# Patient Record
Sex: FEMALE | Race: WHITE | NOT HISPANIC OR LATINO | URBAN - METROPOLITAN AREA
[De-identification: names, ages, dates, MRNs, and addresses within clinical notes are randomized per-mention and may not be internally consistent; named-entity substitution may affect disease eponyms.]

---

## 2017-09-04 ENCOUNTER — EMERGENCY (EMERGENCY)
Facility: HOSPITAL | Age: 27
LOS: 0 days | Discharge: HOME | End: 2017-09-05
Attending: EMERGENCY MEDICINE | Admitting: EMERGENCY MEDICINE
Payer: COMMERCIAL

## 2017-09-04 VITALS
TEMPERATURE: 98 F | WEIGHT: 229.94 LBS | SYSTOLIC BLOOD PRESSURE: 132 MMHG | DIASTOLIC BLOOD PRESSURE: 96 MMHG | HEART RATE: 80 BPM | OXYGEN SATURATION: 100 %

## 2017-09-04 DIAGNOSIS — R10.2 PELVIC AND PERINEAL PAIN: ICD-10-CM

## 2017-09-04 PROCEDURE — 99285 EMERGENCY DEPT VISIT HI MDM: CPT

## 2017-09-05 DIAGNOSIS — Z90.49 ACQUIRED ABSENCE OF OTHER SPECIFIED PARTS OF DIGESTIVE TRACT: Chronic | ICD-10-CM

## 2017-09-05 RX ORDER — OXYCODONE HYDROCHLORIDE 5 MG/1
5 TABLET ORAL ONCE
Qty: 0 | Refills: 0 | Status: DISCONTINUED | OUTPATIENT
Start: 2017-09-05 | End: 2017-09-05

## 2017-09-05 RX ORDER — HYDROCORTISONE 1 %
1 OINTMENT (GRAM) TOPICAL ONCE
Qty: 0 | Refills: 0 | Status: COMPLETED | OUTPATIENT
Start: 2017-09-05 | End: 2017-09-05

## 2017-09-05 RX ORDER — OXYCODONE HYDROCHLORIDE 5 MG/1
1 TABLET ORAL
Qty: 10 | Refills: 0 | OUTPATIENT
Start: 2017-09-05

## 2017-09-05 RX ADMIN — OXYCODONE HYDROCHLORIDE 5 MILLIGRAM(S): 5 TABLET ORAL at 00:59

## 2017-09-05 RX ADMIN — Medication 1 APPLICATION(S): at 00:59

## 2017-09-05 NOTE — ED PROVIDER NOTE - OBJECTIVE STATEMENT
27 F presents with co vaginal pain for the last 2 days that has been getting worse.  Pt states that she was on Augmentin for strep-throat about 2 weeks ago, had an IUD placed last week and started to develop a yeast infection.  She used Monistat and her vaginal pain started after that treatment.  No abd pain, no co HA, dizziness, cp, sob, abd pain, fever, nvd.  No dysuria but urinating exacerbates the vaginal burning.

## 2017-09-05 NOTE — ED PROVIDER NOTE - MEDICAL DECISION MAKING DETAILS
Pt with vaginal pain. will rx with topical steroids to area and pain relief.  Pt advised to follow up with PMD / GYN tomorrow.

## 2017-09-05 NOTE — ED PROVIDER NOTE - GENITOURINARY, MLM
(+) tender erythema to labia majora with few scant small clear vesicles.  No vaginal DC.  tender exam.

## 2020-06-28 ENCOUNTER — EMERGENCY (EMERGENCY)
Facility: HOSPITAL | Age: 30
LOS: 1 days | Discharge: HOME | End: 2020-06-28
Attending: EMERGENCY MEDICINE | Admitting: EMERGENCY MEDICINE
Payer: SELF-PAY

## 2020-06-28 VITALS
TEMPERATURE: 98 F | DIASTOLIC BLOOD PRESSURE: 94 MMHG | HEART RATE: 94 BPM | SYSTOLIC BLOOD PRESSURE: 131 MMHG | RESPIRATION RATE: 16 BRPM | WEIGHT: 229.94 LBS | OXYGEN SATURATION: 100 % | HEIGHT: 62 IN

## 2020-06-28 VITALS
HEART RATE: 78 BPM | OXYGEN SATURATION: 97 % | RESPIRATION RATE: 15 BRPM | TEMPERATURE: 99 F | SYSTOLIC BLOOD PRESSURE: 124 MMHG | DIASTOLIC BLOOD PRESSURE: 90 MMHG

## 2020-06-28 DIAGNOSIS — Z90.49 ACQUIRED ABSENCE OF OTHER SPECIFIED PARTS OF DIGESTIVE TRACT: Chronic | ICD-10-CM

## 2020-06-28 PROCEDURE — 73610 X-RAY EXAM OF ANKLE: CPT | Mod: 26,LT

## 2020-06-28 PROCEDURE — 73630 X-RAY EXAM OF FOOT: CPT | Mod: 26,LT

## 2020-06-28 PROCEDURE — 99053 MED SERV 10PM-8AM 24 HR FAC: CPT

## 2020-06-28 PROCEDURE — 99284 EMERGENCY DEPT VISIT MOD MDM: CPT | Mod: 25

## 2020-06-28 PROCEDURE — 73610 X-RAY EXAM OF ANKLE: CPT

## 2020-06-28 PROCEDURE — 99283 EMERGENCY DEPT VISIT LOW MDM: CPT

## 2020-06-28 PROCEDURE — 73630 X-RAY EXAM OF FOOT: CPT

## 2020-06-28 RX ORDER — OXYCODONE AND ACETAMINOPHEN 5; 325 MG/1; MG/1
1 TABLET ORAL ONCE
Refills: 0 | Status: DISCONTINUED | OUTPATIENT
Start: 2020-06-28 | End: 2020-06-28

## 2020-06-28 RX ADMIN — OXYCODONE AND ACETAMINOPHEN 1 TABLET(S): 5; 325 TABLET ORAL at 01:52

## 2020-06-28 NOTE — ED PROVIDER NOTE - NSFOLLOWUPINSTRUCTIONS_ED_ALL_ED_FT
Return to the ED for any new or worsening symptoms  Take your medication as prescribed  Percocet per label instructions as needed for pain   Keep splint in place, remain non weight bearing until cleared by podiatry   Elevate leg when seated to reduce swelling   Advance activity as tolerated

## 2020-06-28 NOTE — ED PROVIDER NOTE - CLINICAL SUMMARY MEDICAL DECISION MAKING FREE TEXT BOX
Pt is a 31 yo female who presents to the ED with a cc of left ankle pain.  Pt reports that she has no significant past medical history.  States that she was at work today and someone accidently ran over her left ankle with a pallet parker.  She reports pain and swelling to left ankle.  Denies numbness or tingling to ext.  She has not been able to bear weight since the injury.  Denies prior injury to left ankle.  Denies additional injury. Pt denies chance of pregnancy and has not taken anything for the pain.  Pt with MSK injury to left ankle s/p work place injury.  NVI.  Will obtain x-ray, medicate for pain and monitor

## 2020-06-28 NOTE — ED PROVIDER NOTE - MUSCULOSKELETAL, MLM
Left ankle: Diffuse TTP to ankle most significant to lateral aspect with small abrasion, and mild ecchymosis sensation diffusely intact to ext, +pedal pulse cap refill less then 2 seconds.  No TTP to remainder to LLE

## 2020-06-28 NOTE — ED ADULT NURSE NOTE - OBJECTIVE STATEMENT
Pt presents to ED with ankle pain. Pt states she was at work and her ankle got ran over by a machine. Pt is unable to bear weight on LL extremity. Swelling is noted, no bruise at this time. Pt denies numbness and tingling.

## 2020-06-28 NOTE — ED PROVIDER NOTE - PROGRESS NOTE DETAILS
x-rays reviewed with night radiology no acute fracture noted.  Pt advised that sometimes initial fractures can be missed on imaging.  Based on mechanism, pain, and swelling will splint.  Advised to follow up with podiatry for further work up.  Remains NVI.  All questions answered

## 2020-06-28 NOTE — ED ADULT TRIAGE NOTE - MODE OF ARRIVAL
August 30, 2018      Kim Cooks  [address] [place] [id number]      Dear Kim Cooks,    We have been unable to contact you via the telephone regarding your recent test(s) performed at the clinic.     At your earliest convenience, please contact the clinic so that this information may be communicated to you.    Thank you,      Office Of Cristi Schmidt,   [address] [facility] [id number] [phone]   [employer] in [employer]

## 2020-06-28 NOTE — ED PROVIDER NOTE - OBJECTIVE STATEMENT
Pt is a 31 yo female who presents to the ED with a cc of left ankle pain.  Pt reports that she has no significant past medical history.  States that she was at work today and someone accidently ran over her left ankle with a pallet parker.  She reports pain and swelling to left ankle.  Denies numbness or tingling to ext.  She has not been able to bear weight since the injury.  Denies prior injury to left ankle.  Denies additional injury. Pt denies chance of pregnancy and has not taken anything for the pain

## 2020-06-28 NOTE — ED ADULT NURSE NOTE - NSIMPLEMENTINTERV_GEN_ALL_ED
Implemented All Fall Risk Interventions:  [place] to call system. Call bell, personal items and telephone within reach. Instruct patient to call for assistance. Room bathroom lighting operational. Non-slip footwear when patient is off stretcher. Physically safe environment: no spills, clutter or unnecessary equipment. Stretcher in lowest position, wheels locked, appropriate side rails in place. Provide visual cue, wrist band, yellow gown, etc. Monitor gait and stability. Monitor for mental status changes and reorient to person, place, and time. Review medications for side effects contributing to fall risk. Reinforce activity limits and safety measures with patient and family.

## 2020-06-28 NOTE — ED PROVIDER NOTE - PATIENT PORTAL LINK FT
You can access the FollowMyHealth Patient Portal offered by [facility] by registering at the following website: [email or web address]. By joining [facility]’s FollowMyHealth portal, you will also be able to view your health information using other applications (apps) compatible with our system.

## 2020-06-28 NOTE — ED PROVIDER NOTE - CARE PROVIDER_API CALL
Marco A Lopez (DPVERONIKA)  [place] [address]  Phone: [phone]  Fax: [phone]  Follow Up Time:

## 2020-07-01 ENCOUNTER — APPOINTMENT (OUTPATIENT)
Dept: WOUND CARE | Facility: HOSPITAL | Age: 30
End: 2020-07-01
Payer: OTHER MISCELLANEOUS

## 2020-07-01 ENCOUNTER — OUTPATIENT (OUTPATIENT)
Dept: OUTPATIENT SERVICES | Facility: HOSPITAL | Age: 30
LOS: 1 days | Discharge: HOME | End: 2020-07-01
Payer: COMMERCIAL

## 2020-07-01 VITALS
TEMPERATURE: 97.7 F | DIASTOLIC BLOOD PRESSURE: 87 MMHG | HEART RATE: 88 BPM | BODY MASS INDEX: 42.33 KG/M2 | OXYGEN SATURATION: 98 % | SYSTOLIC BLOOD PRESSURE: 139 MMHG | WEIGHT: 230 LBS | RESPIRATION RATE: 18 BRPM | HEIGHT: 62 IN

## 2020-07-01 DIAGNOSIS — Z90.49 ACQUIRED ABSENCE OF OTHER SPECIFIED PARTS OF DIGESTIVE TRACT: Chronic | ICD-10-CM

## 2020-07-01 DIAGNOSIS — S93.409A SPRAIN OF UNSPECIFIED LIGAMENT OF UNSPECIFIED ANKLE, INITIAL ENCOUNTER: ICD-10-CM

## 2020-07-01 DIAGNOSIS — Z78.9 OTHER SPECIFIED HEALTH STATUS: ICD-10-CM

## 2020-07-01 PROCEDURE — G0463: CPT

## 2020-07-01 PROCEDURE — 99203 OFFICE O/P NEW LOW 30 MIN: CPT

## 2020-07-01 RX ORDER — LEVONORGESTREL 19.5 MG/1
INTRAUTERINE DEVICE INTRAUTERINE
Refills: 0 | Status: ACTIVE | COMMUNITY

## 2020-07-01 NOTE — REVIEW OF SYSTEMS
[Fever] : no fever [Eye Pain] : no eye pain [Earache] : no earache [Chest Pain] : no chest pain [Shortness Of Breath] : no shortness of breath [Cough] : no cough [Abdominal Pain] : no abdominal pain [Limb Pain] : limb pain [Skin Wound] : no skin wound [Dizziness] : no dizziness

## 2020-07-01 NOTE — PLAN
[FreeTextEntry1] : Patient examined and evaluated at this time.\par Continue offloading with posterior splint and crutches.\par Will obtain an mri of the left midfoot and rearfoot.\par Pt will return in 2 weeks for follow up.\par \par

## 2020-07-01 NOTE — ASSESSMENT
[Verbal] : Verbal [Written] : Written [Patient] : Patient [Demo] : Demo [Good - alert, interested, motivated] : Good - alert, interested, motivated [Verbalizes knowledge/Understanding] : Verbalizes knowledge/understanding [Foot Care] : foot care [How and When to Call] : how and when to call [Signs and symptoms of infection] : sign and symptoms of infection [Labs and Tests] : labs and tests [Off-loading] : off-loading [Compression Therapy] : compression therapy [Patient responsibility to plan of care] : patient responsibility to plan of care [] : Yes [Crutches] : Crutches [Stable] : stable [Home] : Home [Not Applicable - Long Term Care/Home Health Agency] : Long Term Care/Home Health Agency: Not Applicable [FreeTextEntry2] : Infection prevention\par Offloading \par Acceptable pain tolerance levels deemed to be 3/10.\par Goal of remaining pain free regarding wounds.\par  [FreeTextEntry4] : Auth submitted for MRI\par x rays reviewed by DPM with patient \par Follow up in 2 weeks

## 2020-07-01 NOTE — PHYSICAL EXAM
[JVD] : jugular venous distention ~L [2+] : left 2+ [Calm] : calm [Ankle Swelling (On Exam)] : not present [Varicose Veins Of Lower Extremities] : not present [] : not present [de-identified] : calm [de-identified] : tenderness to palpation of the left midfoot and rearfoot, ROM deferred [de-identified] : ecchymosis noted to the lateral midfoot [FreeTextEntry1] : Left foot - Minor swelling - No open wound  [de-identified] : Expressed comfort post ACE application. [de-identified] : No other treatment \par  [de-identified] : Splint  [de-identified] : Dorsalis Pedis:  + 2 Bilateral \par Posterior Tibialis:  +2 Bilateral \par Doppler pulses:  N/A\par Extremity color: Pink \par Extremity temperature: Warm \par Capillary refill: < 3 sec\par \par  [de-identified] : Normal [de-identified] : None [de-identified] : None [de-identified] : No [de-identified] : Ace wraps [de-identified] : Weekly [de-identified] : Compression

## 2020-07-01 NOTE — REASON FOR VISIT
[Consultation] : a consultation visit [FreeTextEntry1] : Patients left foot/ankle was accidently run over by a large pallet at work at [employer]. Patient went to [place] ER that day where x rays where taken and were negative for any fractures. Splint applied, patient using crutches.

## 2020-07-01 NOTE — HISTORY OF PRESENT ILLNESS
[FreeTextEntry1] : Patients left foot/ankle was accidently run over by a large pallet at work at [employer] on 6/27/20. Patient went to [place] ER the same day where x rays where taken and were negative for any fractures. Splint applied, patient using crutches.

## 2020-07-01 NOTE — VITALS
[Pain related to present condition?] : The patient's  pain is related to present condition. [Dull] : dull [] : Yes [Occasional] : occasional [de-identified] : 3/10       Acceptable pain tolerance levels deemed to be 3/10. [FreeTextEntry3] : Left foot  [FreeTextEntry1] : Tylenol  [FreeTextEntry2] : Weight bearing  [FreeTextEntry4] : New splinted dressing

## 2020-07-02 DIAGNOSIS — Z90.49 ACQUIRED ABSENCE OF OTHER SPECIFIED PARTS OF DIGESTIVE TRACT: ICD-10-CM

## 2020-07-02 DIAGNOSIS — M79.672 PAIN IN LEFT FOOT: ICD-10-CM

## 2020-07-02 DIAGNOSIS — Z87.828 PERSONAL HISTORY OF OTHER (HEALED) PHYSICAL INJURY AND TRAUMA: ICD-10-CM

## 2020-07-13 PROBLEM — Z78.9 OTHER SPECIFIED HEALTH STATUS: Chronic | Status: ACTIVE | Noted: 2020-06-28

## 2020-07-14 ENCOUNTER — OUTPATIENT (OUTPATIENT)
Dept: OUTPATIENT SERVICES | Facility: HOSPITAL | Age: 30
LOS: 1 days | Discharge: HOME | End: 2020-07-14
Payer: COMMERCIAL

## 2020-07-14 ENCOUNTER — APPOINTMENT (OUTPATIENT)
Dept: WOUND CARE | Facility: HOSPITAL | Age: 30
End: 2020-07-14
Payer: OTHER MISCELLANEOUS

## 2020-07-14 VITALS
RESPIRATION RATE: 20 BRPM | HEART RATE: 89 BPM | SYSTOLIC BLOOD PRESSURE: 141 MMHG | TEMPERATURE: 97.6 F | HEIGHT: 62 IN | WEIGHT: 230 LBS | DIASTOLIC BLOOD PRESSURE: 93 MMHG | OXYGEN SATURATION: 96 % | BODY MASS INDEX: 42.33 KG/M2

## 2020-07-14 DIAGNOSIS — Z90.49 ACQUIRED ABSENCE OF OTHER SPECIFIED PARTS OF DIGESTIVE TRACT: Chronic | ICD-10-CM

## 2020-07-14 DIAGNOSIS — M79.672 PAIN IN LEFT FOOT: ICD-10-CM

## 2020-07-14 DIAGNOSIS — Z87.828 PERSONAL HISTORY OF OTHER (HEALED) PHYSICAL INJURY AND TRAUMA: ICD-10-CM

## 2020-07-14 DIAGNOSIS — Z90.49 ACQUIRED ABSENCE OF OTHER SPECIFIED PARTS OF DIGESTIVE TRACT: ICD-10-CM

## 2020-07-14 PROCEDURE — G0463: CPT

## 2020-07-14 PROCEDURE — 99213 OFFICE O/P EST LOW 20 MIN: CPT

## 2020-07-15 NOTE — PHYSICAL EXAM
[JVD] : jugular venous distention ~L [2+] : left 2+ [Calm] : calm [] : not present [Ankle Swelling (On Exam)] : not present [Varicose Veins Of Lower Extremities] : not present [de-identified] : ecchymosis noted to the lateral midfoot [de-identified] : calm [de-identified] : tenderness to palpation of the left midfoot and rearfoot, ROM deferred, MRI shows peroneus brevis tear [de-identified] : No neurovascular deficit noted [FreeTextEntry1] : Left Foot - no open wound [de-identified] : Ace wraps [de-identified] : Compression [de-identified] : Weekly

## 2020-07-15 NOTE — PLAN
[FreeTextEntry1] : Patient examined and evaluated at this time.\par Continue offloading crutches.\par MRI reviewed with patient.\par Discussed conservative vs surgical treatment. Will proceed with conservative treatment at this time.\par Pt will return in 4 weeks for follow up.\par \par

## 2020-07-15 NOTE — ASSESSMENT
[Written] : Written [Verbal] : Verbal [Good - alert, interested, motivated] : Good - alert, interested, motivated [Patient] : Patient [Verbalizes knowledge/Understanding] : Verbalizes knowledge/understanding [Dressing changes] : dressing changes [Skin Care] : skin care [Compression Therapy] : compression therapy [Foot Care] : foot care [Pressure relief] : pressure relief [How and When to Call] : how and when to call [Off-loading] : off-loading [Patient responsibility to plan of care] : patient responsibility to plan of care [Stable] : stable [Home] : Home [Crutches] : Crutches [Not Applicable - Long Term Care/Home Health Agency] : Long Term Care/Home Health Agency: Not Applicable [] : No [FreeTextEntry2] : Offloading \par Pain management strategies to maintain acceptable limits of tolerance  [FreeTextEntry4] : F/U in 1 month \par Discussed findings of MRI\par Discussed possible surgical options pending progress \par Continue cool compresses and elevation\par

## 2020-07-15 NOTE — VITALS
[Pain related to present condition?] : The patient's  pain is related to present condition. [Occasional] : occasional [Shooting] : shooting [] : No [de-identified] : Occasional pain - when patient puts pressure foot [FreeTextEntry3] : Left Foot [FreeTextEntry2] : Weight bearing  [FreeTextEntry1] : Rest [FreeTextEntry4] : Rest/elevate foot

## 2020-07-15 NOTE — HISTORY OF PRESENT ILLNESS
[FreeTextEntry1] : Patients left foot/ankle was accidently run over by a large pallet at work at [employer] on 6/27/20. Patient went to [place] ER the same day where x rays where taken and were negative for any fractures. Splint applied, patient using crutches. Pt notes she was able to obtain an MRI of the left foot as advised. Relates her foot feels much better and is only tender when putting pressure on the left foot.

## 2020-07-15 NOTE — REVIEW OF SYSTEMS
[Limb Pain] : limb pain [Earache] : no earache [Fever] : no fever [Eye Pain] : no eye pain [Cough] : no cough [Shortness Of Breath] : no shortness of breath [Chest Pain] : no chest pain [Abdominal Pain] : no abdominal pain [Dizziness] : no dizziness [Skin Wound] : no skin wound [FreeTextEntry9] : MRI shows peroneus brevis tear

## 2020-07-21 ENCOUNTER — TRANSCRIPTION ENCOUNTER (OUTPATIENT)
Age: 30
End: 2020-07-21

## 2020-08-10 ENCOUNTER — APPOINTMENT (OUTPATIENT)
Dept: WOUND CARE | Facility: HOSPITAL | Age: 30
End: 2020-08-10
Payer: OTHER MISCELLANEOUS

## 2020-08-10 ENCOUNTER — OUTPATIENT (OUTPATIENT)
Dept: OUTPATIENT SERVICES | Facility: HOSPITAL | Age: 30
LOS: 1 days | Discharge: HOME | End: 2020-08-10
Payer: COMMERCIAL

## 2020-08-10 VITALS
HEIGHT: 62 IN | RESPIRATION RATE: 20 BRPM | TEMPERATURE: 98.1 F | SYSTOLIC BLOOD PRESSURE: 131 MMHG | OXYGEN SATURATION: 97 % | HEART RATE: 86 BPM | DIASTOLIC BLOOD PRESSURE: 87 MMHG | WEIGHT: 230 LBS | BODY MASS INDEX: 42.33 KG/M2

## 2020-08-10 DIAGNOSIS — M79.672 PAIN IN LEFT FOOT: ICD-10-CM

## 2020-08-10 DIAGNOSIS — Z87.828 PERSONAL HISTORY OF OTHER (HEALED) PHYSICAL INJURY AND TRAUMA: ICD-10-CM

## 2020-08-10 DIAGNOSIS — Z90.49 ACQUIRED ABSENCE OF OTHER SPECIFIED PARTS OF DIGESTIVE TRACT: Chronic | ICD-10-CM

## 2020-08-10 PROCEDURE — 99213 OFFICE O/P EST LOW 20 MIN: CPT

## 2020-08-10 PROCEDURE — G0463: CPT

## 2020-08-10 NOTE — ASSESSMENT
[Written] : Written [Verbal] : Verbal [Patient] : Patient [Good - alert, interested, motivated] : Good - alert, interested, motivated [Dressing changes] : dressing changes [Foot Care] : foot care [Verbalizes knowledge/Understanding] : Verbalizes knowledge/understanding [How and When to Call] : how and when to call [Pressure relief] : pressure relief [Skin Care] : skin care [Compression Therapy] : compression therapy [Off-loading] : off-loading [] : Yes [Patient responsibility to plan of care] : patient responsibility to plan of care [Stable] : stable [Home] : Home [Crutches] : Crutches [Not Applicable - Long Term Care/Home Health Agency] : Long Term Care/Home Health Agency: Not Applicable [FreeTextEntry4] : Discharged/Photos Taken/John\par Note from DPM given to patient to return back to work.\par  [FreeTextEntry2] : Offloading \par Pain management strategies to maintain acceptable limits of tolerance

## 2020-08-10 NOTE — REVIEW OF SYSTEMS
[Fever] : no fever [Eye Pain] : no eye pain [Earache] : no earache [Shortness Of Breath] : no shortness of breath [Chest Pain] : no chest pain [Cough] : no cough [Limb Pain] : limb pain [Abdominal Pain] : no abdominal pain [Skin Wound] : no skin wound [Dizziness] : no dizziness [FreeTextEntry9] : MRI shows peroneus brevis tear

## 2020-08-10 NOTE — HISTORY OF PRESENT ILLNESS
[FreeTextEntry1] : Patients left foot/ankle was accidently run over by a large pallet at work at [employer] on 6/27/20. Patient went to [place] ER the same day where x rays where taken and were negative for any fractures. Splint applied, patient using crutches. Pt relates her foot feels much better and denies any pain at this time. Denies any other complaints.

## 2020-08-10 NOTE — PHYSICAL EXAM
[JVD] : jugular venous distention ~L [2+] : left 2+ [Varicose Veins Of Lower Extremities] : not present [Ankle Swelling (On Exam)] : not present [] : not present [Calm] : calm [de-identified] : no tenderness to palpation of the left midfoot and rearfoot, ROM deferred, MRI shows peroneus brevis tear [de-identified] : calm [de-identified] : No neurovascular deficit noted [de-identified] : ecchymosis to the lateral midfoot resolved [FreeTextEntry1] : Left Foot - no open wound [de-identified] : Weekly [de-identified] : Ace wraps [de-identified] : Compression

## 2020-08-10 NOTE — PLAN
[FreeTextEntry1] : Patient happy with outcome of treatment.\par Patient to be discharged at this time.\par All questions answered to satisfaction and patient verbalized understanding.\par Patient may return to work without any restrictions.

## 2020-08-12 DIAGNOSIS — M79.672 PAIN IN LEFT FOOT: ICD-10-CM

## 2020-08-12 DIAGNOSIS — Z87.828 PERSONAL HISTORY OF OTHER (HEALED) PHYSICAL INJURY AND TRAUMA: ICD-10-CM

## 2020-08-12 DIAGNOSIS — Z90.49 ACQUIRED ABSENCE OF OTHER SPECIFIED PARTS OF DIGESTIVE TRACT: ICD-10-CM

## 2022-02-15 NOTE — ED PROVIDER NOTE - ENMT, MLM
Fall, musculoskeletal chest pain, with deep breath.    Labs and imaging reviewed Airway patent, Nasal mucosa clear. Mouth with normal mucosa. Throat has no vesicles, no oropharyngeal exudates and uvula is midline.

## 2025-02-16 ENCOUNTER — NON-APPOINTMENT (OUTPATIENT)
Age: 35
End: 2025-02-16